# Patient Record
Sex: MALE | Race: WHITE | Employment: UNEMPLOYED | URBAN - METROPOLITAN AREA
[De-identification: names, ages, dates, MRNs, and addresses within clinical notes are randomized per-mention and may not be internally consistent; named-entity substitution may affect disease eponyms.]

---
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
## 2024-01-08 ENCOUNTER — TELEPHONE (OUTPATIENT)
Dept: PULMONOLOGY | Age: 19
End: 2024-01-08

## 2024-01-08 NOTE — TELEPHONE ENCOUNTER
Patient did not show for 31-90 day Set up 11/30/23  appointment  with Elisabeth Mann on 1/8/24    Same Day Cancellation: No    Patient rescheduled:  No    New appointment: n/a    Patient was also no show on: n/a    LOV 8/1/23    Assessment:       Moderate RONNIE  Snoring  Observed sleep apnea   Hypersomnia   Obesity  Morning headache         Plan:      Treatment options were discussed with patient for RONNIE, including CPAP therapy, oral appliances and upper airway surgery.  Patient is in favor of CPAP titration  CPAP titration  Advised to use CPAP 6-8 hrs at night and during naps  Replacement of mask, tubing, head straps every 3-6 months or sooner if damaged.   Patient instructed to contact [facility] for any mask, tubing or machine trouble shooting if problems arise  Sleep hygiene  Avoid sedatives, alcohol and caffeinated drinks at bed time.  No driving motorized vehicles or operating heavy machinery while fatigue, drowsy or sleepy.   Weight loss is also recommended as a long-term intervention.    Complications of RONNIE if not treated were discussed with patient patient to include systemic hypertension, pulmonary hypertension, cardiovascular morbidities, car accidents and all cause mortality.  Discussed pathophysiology of RONNIE with patient today  Patient education provided regarding sleep tips

## 2024-01-15 ENCOUNTER — OFFICE VISIT (OUTPATIENT)
Dept: PULMONOLOGY | Age: 19
End: 2024-01-15
Payer: COMMERCIAL

## 2024-01-15 ENCOUNTER — TELEPHONE (OUTPATIENT)
Dept: PULMONOLOGY | Age: 19
End: 2024-01-15

## 2024-01-15 VITALS
HEART RATE: 76 BPM | DIASTOLIC BLOOD PRESSURE: 82 MMHG | BODY MASS INDEX: 40.43 KG/M2 | OXYGEN SATURATION: 98 % | HEIGHT: 74 IN | SYSTOLIC BLOOD PRESSURE: 126 MMHG | WEIGHT: 315 LBS

## 2024-01-15 DIAGNOSIS — Z71.89 CPAP USE COUNSELING: ICD-10-CM

## 2024-01-15 DIAGNOSIS — E66.9 OBESITY (BMI 30-39.9): ICD-10-CM

## 2024-01-15 DIAGNOSIS — R53.83 OTHER FATIGUE: ICD-10-CM

## 2024-01-15 DIAGNOSIS — G47.33 MODERATE OBSTRUCTIVE SLEEP APNEA: Primary | ICD-10-CM

## 2024-01-15 PROCEDURE — 99213 OFFICE O/P EST LOW 20 MIN: CPT | Performed by: NURSE PRACTITIONER

## 2024-01-15 ASSESSMENT — SLEEP AND FATIGUE QUESTIONNAIRES
HOW LIKELY ARE YOU TO NOD OFF OR FALL ASLEEP WHEN YOU ARE A PASSENGER IN A CAR FOR AN HOUR WITHOUT A BREAK: 1
ESS TOTAL SCORE: 7
HOW LIKELY ARE YOU TO NOD OFF OR FALL ASLEEP WHILE WATCHING TV: 1
HOW LIKELY ARE YOU TO NOD OFF OR FALL ASLEEP WHILE SITTING AND READING: 1
HOW LIKELY ARE YOU TO NOD OFF OR FALL ASLEEP WHILE LYING DOWN TO REST IN THE AFTERNOON WHEN CIRCUMSTANCES PERMIT: 2
NECK CIRCUMFERENCE (INCHES): 19
HOW LIKELY ARE YOU TO NOD OFF OR FALL ASLEEP WHILE SITTING INACTIVE IN A PUBLIC PLACE: 0
HOW LIKELY ARE YOU TO NOD OFF OR FALL ASLEEP IN A CAR, WHILE STOPPED FOR A FEW MINUTES IN TRAFFIC: 0
HOW LIKELY ARE YOU TO NOD OFF OR FALL ASLEEP WHILE SITTING QUIETLY AFTER LUNCH WITHOUT ALCOHOL: 2
HOW LIKELY ARE YOU TO NOD OFF OR FALL ASLEEP WHILE SITTING AND TALKING TO SOMEONE: 0

## 2024-01-15 NOTE — PROGRESS NOTES
Patient ID: Yefri Andrade is a 18 y.o. male who is being seen today for   Chief Complaint   Patient presents with    Follow-up     31-90 follow up      Referring Dr. Diego Grande    HPI:     Yefri Andrade is a 18 y.o. male in office for RONNIE follow up.   CPAP titration was reviewed by me and noted below.  Results were dicussed with patient and multiple good questions were answered.  Patient started CPAP after testing.  States CPAP worked well he just needs to be better at using it.  States he did feel better, more energetic in the mornings.  States he falls asleep without it.      Patient is using CPAP 5-6 hrs/night. Using humidifier. No snoring on CPAP. The pressure is well tolerated. The mask is comfortable-full face (nasal mask did not work). No mask leak. No significant daytime sleepiness when using CPAP. Denies nodding off when driving. No dry nose or throat. Some fatigue when not using CPAP. Bedtime is 11 pm- MN and rise time is 6 am. Sleep onset is usually few minutes. Wakes up 1 times at night total. No nocturia. It takes few minutes to fall back a sleep. Occasional naps during the day. Occasional headache in am. No weight gain. 2 caffienated beverages during the day. No alcohol. ESS is 7        Initial HPI 5/12/23  Yefri Andrade is a 18 y.o. male for televideo appointment via video and audio virtual visit for snoring evaluation. Patient reports snoring at night for the past 5+ years. Worse in supine position. Wakes self snoring if dozing. Has witnessed apnea.  No restorative sleep. Sometimes dry mouth upon awakening. Fatigue and tiredness during the day. No history of sleep apnea. Bedtime 10-11 pm and rise time is 630 am. It takes usually 5-10 minutes to fall asleep.  No nocturia. Wakes up 1-2 times at night. It takes 30 minutes to fall back a sleep- look at phone. Takes 1 nap during the day when he can ( 60 minutes). Sometimes headache in am. No car wrecks or near wrecks because of the sleepiness. No nodding off

## 2024-01-15 NOTE — TELEPHONE ENCOUNTER
Faxed 31-90 ov notes and CR to [facility] at [phone] via [employer].

## 2024-01-15 NOTE — PATIENT INSTRUCTIONS
Set alarm or place CPAP on pillow at night to help get into consistent routine of wearing CPAP nightly.        Here are some tips to to getting better sleep  1- Avoid napping during the day: This will ensure you are tired at bedtime. If you have to take a nap, sleep less than one hour, before 3 pm.   2- Exercise regularly, but not right before bed: but the timing of the workout is important. Exercising in the morning or early afternoon will not interfere with sleep.  Exercising within two hours before bedtime can decrease your ability to fall asleep.  Regular exercise is recommended to help you deepen the sleep.   3- Avoid heavy, spicy, or sugary foods 4-6 hours before bedtime: These can affect your ability to stay asleep.  4- Have a light snack before bed: Having an empty stomach can interfere with your sleep. Dairy products and turkey contain tryptophan, which acts as a natural sleep inducer.   5- Stay away from caffeine, nicotine and alcohol at least 4-6 hours before bed: Caffeine and nicotine are stimulants that interfere with your ability to fall asleep. While alcohol has an immediate sleep-inducing effect, a few hours later, as alcohol levels in your blood start to fall, there is a stimulant effect and you will experience fragmented sleep.   6- Take a hot bath 90 minutes before bedtime:  A hot bath will raise your body temperature, but it is the drop in body temperature that may leave you feeling sleepy  7- Develop sleep rituals: it is important to give your body cues that it is time to slow down and sleep. Listen to relaxing music, read something soothing for 15 minutes, have a cup of caffeine free tea, or do relaxation exercises such as yoga or deep breathing help relieve anxiety and reduce muscle tension.   8- Fix a bedtime and an awakening time: Even on weekends! When your sleep cycle has a regular rhythm, you will feel better.   9- Sleep only when sleepy: This reduces the time you are awake in bed.   10-

## 2024-02-27 ENCOUNTER — TELEMEDICINE (OUTPATIENT)
Dept: SLEEP MEDICINE | Age: 19
End: 2024-02-27
Payer: COMMERCIAL

## 2024-02-27 DIAGNOSIS — Z71.89 CPAP USE COUNSELING: ICD-10-CM

## 2024-02-27 DIAGNOSIS — R53.83 OTHER FATIGUE: ICD-10-CM

## 2024-02-27 DIAGNOSIS — G47.33 MODERATE OBSTRUCTIVE SLEEP APNEA: Primary | ICD-10-CM

## 2024-02-27 DIAGNOSIS — E66.9 OBESITY (BMI 30-39.9): ICD-10-CM

## 2024-02-27 PROCEDURE — 99213 OFFICE O/P EST LOW 20 MIN: CPT | Performed by: NURSE PRACTITIONER

## 2024-02-27 ASSESSMENT — SLEEP AND FATIGUE QUESTIONNAIRES
HOW LIKELY ARE YOU TO NOD OFF OR FALL ASLEEP WHILE SITTING QUIETLY AFTER LUNCH WITHOUT ALCOHOL: 1
HOW LIKELY ARE YOU TO NOD OFF OR FALL ASLEEP WHILE SITTING INACTIVE IN A PUBLIC PLACE: 0
HOW LIKELY ARE YOU TO NOD OFF OR FALL ASLEEP WHEN YOU ARE A PASSENGER IN A CAR FOR AN HOUR WITHOUT A BREAK: 1
ESS TOTAL SCORE: 5
HOW LIKELY ARE YOU TO NOD OFF OR FALL ASLEEP WHILE LYING DOWN TO REST IN THE AFTERNOON WHEN CIRCUMSTANCES PERMIT: 1
HOW LIKELY ARE YOU TO NOD OFF OR FALL ASLEEP IN A CAR, WHILE STOPPED FOR A FEW MINUTES IN TRAFFIC: 0
HOW LIKELY ARE YOU TO NOD OFF OR FALL ASLEEP WHILE SITTING AND TALKING TO SOMEONE: 0
HOW LIKELY ARE YOU TO NOD OFF OR FALL ASLEEP WHILE WATCHING TV: 1
HOW LIKELY ARE YOU TO NOD OFF OR FALL ASLEEP WHILE SITTING AND READING: 1

## 2024-02-27 NOTE — PROGRESS NOTES
synchronous (real-time) audio-video encounter. The patient (or guardian if applicable) is aware that this is a billable service, which includes applicable co-pays. This Virtual Visit was conducted with patient's (and/or legal guardian's) consent. Patient identification was verified, and a caregiver was present when appropriate.   The patient was located at Home: [address]  [address]  [place] [address]  Provider was located at Home (Appt Dept State): [place]       Total time spent for this encounter: Not billed by time    --JACK LAYTON CNP on 2/27/2024 at 4:21 PM    An electronic signature was used to authenticate this note.

## 2024-04-12 ENCOUNTER — TELEMEDICINE (OUTPATIENT)
Dept: PULMONOLOGY | Age: 19
End: 2024-04-12
Payer: COMMERCIAL

## 2024-04-12 DIAGNOSIS — Z78.9 DIFFICULTY USING CONTINUOUS POSITIVE AIRWAY PRESSURE (CPAP) DEVICE: ICD-10-CM

## 2024-04-12 DIAGNOSIS — E66.9 OBESITY (BMI 30-39.9): ICD-10-CM

## 2024-04-12 DIAGNOSIS — G47.33 MODERATE OBSTRUCTIVE SLEEP APNEA: Primary | ICD-10-CM

## 2024-04-12 DIAGNOSIS — Z71.89 CPAP USE COUNSELING: ICD-10-CM

## 2024-04-12 PROCEDURE — 99214 OFFICE O/P EST MOD 30 MIN: CPT | Performed by: NURSE PRACTITIONER

## 2024-04-12 ASSESSMENT — SLEEP AND FATIGUE QUESTIONNAIRES
HOW LIKELY ARE YOU TO NOD OFF OR FALL ASLEEP WHEN YOU ARE A PASSENGER IN A CAR FOR AN HOUR WITHOUT A BREAK: WOULD NEVER DOZE
HOW LIKELY ARE YOU TO NOD OFF OR FALL ASLEEP WHILE WATCHING TV: SLIGHT CHANCE OF DOZING
HOW LIKELY ARE YOU TO NOD OFF OR FALL ASLEEP WHILE LYING DOWN TO REST IN THE AFTERNOON WHEN CIRCUMSTANCES PERMIT: SLIGHT CHANCE OF DOZING
HOW LIKELY ARE YOU TO NOD OFF OR FALL ASLEEP IN A CAR, WHILE STOPPED FOR A FEW MINUTES IN TRAFFIC: WOULD NEVER DOZE
HOW LIKELY ARE YOU TO NOD OFF OR FALL ASLEEP WHILE SITTING AND TALKING TO SOMEONE: WOULD NEVER DOZE
HOW LIKELY ARE YOU TO NOD OFF OR FALL ASLEEP WHILE SITTING INACTIVE IN A PUBLIC PLACE: WOULD NEVER DOZE
HOW LIKELY ARE YOU TO NOD OFF OR FALL ASLEEP WHILE SITTING AND READING: SLIGHT CHANCE OF DOZING
ESS TOTAL SCORE: 3
HOW LIKELY ARE YOU TO NOD OFF OR FALL ASLEEP WHILE SITTING QUIETLY AFTER LUNCH WITHOUT ALCOHOL: WOULD NEVER DOZE

## 2024-04-12 NOTE — PROGRESS NOTES
Patient ID: Yefri Andrade is a 18 y.o. male who is being seen today for   Chief Complaint   Patient presents with    Follow-up     Sleep     Referring Dr. Diego Grande    HPI:   Yefri Andrade is a 18 y.o. male for televideo appointment via video and audio virtual visit for RONNIE follow up.  States last few times he used CPAP it was waking him SOB right when falls asleep.  States also pressure too low initially.  Patient is using CPAP 1-2 hrs/night. Using humidifier. No snoring on CPAP.  The mask is comfortable- full face. No mask leak. No significant daytime sleepiness. No nodding off when driving. No dry nose or throat. Some fatigue. Bedtime is MN and rise time is 6 am. Sleep onset is usually 60 minutes- might watch TV. Wakes up 0-1 times at night total. No nocturia. It takes few minutes to fall back a sleep. Occasional naps during the day. Rare headache in am. No weight gain. 1 caffienated beverages during the day. ESS is 3        Previous HPI 2/27/24  Yefri Andrade is a 18 y.o. male for televideo appointment via video and audio virtual visit for RONNIE follow up.  States he had not been using CPAP for awhile, states was sick then out of habit, just restarted.  He cannot verbalize any barriers to CPAP use.  Patient is using CPAP   6 hrs/night. Using humidifier. No snoring on CPAP. The pressure is well tolerated. The mask is comfortable- full face. No mask leak. No significant daytime sleepiness. No nodding off when driving. No dry nose or throat. +fatigue. Bedtime is 11 pm- MN and rise time is 6 am. Sleep onset is few minutes. Wakes up 0-1 times at night total. No nocturia. It takes few minutes to fall back a sleep. Occasional naps during the day. No headache in am. No weight gain. 1 caffienated beverages during the day.  ESS is 5      Previous HPI  1/15/24  Yefri Andrade is a 18 y.o. male in office for RONNIE follow up.   CPAP titration was reviewed by me and noted below.  Results were dicussed with patient and multiple good

## 2024-05-28 ENCOUNTER — TELEPHONE (OUTPATIENT)
Dept: PULMONOLOGY | Age: 19
End: 2024-05-28

## 2024-05-28 NOTE — TELEPHONE ENCOUNTER
Patient cancelled appointment on 5/28/24 with Elisabeth Mann for 4-6 week sleep.    Reason: pt has been moving and hasn't used his machine    Patient did reschedule appointment.    Appointment rescheduled for 7/12/24.    Last OV 4/12/24:    Assessment:       Moderate RONNIE.  Auto CPAP 11-14 cm H2O.  Suboptimal compliance on review  Snoring-resolved on CPAP  Observed sleep apnea -resolved with CPAP  Hypersomnia -improved with CPAP use  Obesity  Morning headache         Plan:      Continue auto CPAP 11-14 cm H2O  Changed ramp start from 4 to 7 cm H2O  Consider BiPAP titration if no improvement  Discussed severity of sleep apnea and importance of treatment  Interpreted and reviewed CPAP download data with patient  Discussed set alarm to remind patient to use CPAP, incorporated into his nightly routine.    Advised to use CPAP 6-8 hrs at night and during naps  Replacement of mask, tubing, head straps every 3-6 months or sooner if damaged.   Patient instructed to contact [employer] for any mask, tubing or machine trouble shooting if problems arise  Sleep hygiene  Avoid sedatives, alcohol and caffeinated drinks at bed time.  No driving motorized vehicles or operating heavy machinery while fatigue, drowsy or sleepy.   Weight loss is also recommended as a long-term intervention.    Complications of RONNIE if not treated were discussed with patient patient to include systemic hypertension, pulmonary hypertension, cardiovascular morbidities, car accidents and all cause mortality.  Discussed pathophysiology of RONNIE with patient today  Patient education provided regarding sleep tips

## 2024-07-23 ENCOUNTER — TELEMEDICINE (OUTPATIENT)
Dept: SLEEP MEDICINE | Age: 19
End: 2024-07-23
Payer: COMMERCIAL

## 2024-07-23 DIAGNOSIS — R53.83 OTHER FATIGUE: ICD-10-CM

## 2024-07-23 DIAGNOSIS — G47.33 MODERATE OBSTRUCTIVE SLEEP APNEA: Primary | ICD-10-CM

## 2024-07-23 DIAGNOSIS — E66.9 OBESITY (BMI 30-39.9): ICD-10-CM

## 2024-07-23 DIAGNOSIS — Z78.9 DIFFICULTY USING CONTINUOUS POSITIVE AIRWAY PRESSURE (CPAP) DEVICE: ICD-10-CM

## 2024-07-23 DIAGNOSIS — Z71.89 CPAP USE COUNSELING: ICD-10-CM

## 2024-07-23 PROCEDURE — 99214 OFFICE O/P EST MOD 30 MIN: CPT | Performed by: NURSE PRACTITIONER

## 2024-07-23 NOTE — PROGRESS NOTES
Patient ID: Yefri Andrade is a 18 y.o. male who is being seen today for   Chief Complaint   Patient presents with    Follow-up     Discuss other options     Referring Dr. Diego Grande    HPI:   Yefri Andrade is a 18 y.o. male for televideo appointment via video and audio virtual visit for RONNIE follow up.  States he is not using CPAP.  States he just does not like having the mask on his face.  States he will continue to try to use CPAP but would like more information on other treatment options.  States he might be interested in surgical options or dental appliance.    Using humidifier if using CPAP. No snoring on CPAP. The pressure is well tolerated. The mask is comfortable- full face mask. No mask leak. +daytime sleepiness. Denies nodding off when driving. No dry nose or throat.  +fatigue. Bedtime is 11 pm- MN and rise time is 630 am. Sleep onset is few-30 minutes. Wakes up 0 times at night total. No nocturia. Occasional naps during the day. No headache in am. No weight gain. 1 caffienated beverages during the day. No alcohol. ESS is 6          Previous   Yefri Andrade is a 18 y.o. male for televideo appointment via video and audio virtual visit for RONNIE follow up.  States last few times he used CPAP it was waking him SOB right when falls asleep.  States also pressure too low initially.  Patient is using CPAP 1-2 hrs/night. Using humidifier. No snoring on CPAP.  The mask is comfortable- full face. No mask leak. No significant daytime sleepiness. No nodding off when driving. No dry nose or throat. Some fatigue. Bedtime is MN and rise time is 6 am. Sleep onset is usually 60 minutes- might watch TV. Wakes up 0-1 times at night total. No nocturia. It takes few minutes to fall back a sleep. Occasional naps during the day. Rare headache in am. No weight gain. 1 caffienated beverages during the day. ESS is 3        Previous HPI 2/27/24  Yefri Andrade is a 18 y.o. male for televideo appointment via video and audio virtual visit for RONNIE

## 2024-07-23 NOTE — PATIENT INSTRUCTIONS
Contact Dr. Damon [facility] Sleep dentist for information regarding oral airway appliance.   Phone: [phone]  Address: [address] [place] [address] [address], [place] [address]    It is recommend you call your insurance plan to check if this provider is in network with your plan prior to first visit.       Here are some tips to to getting better sleep  1- Avoid napping during the day: This will ensure you are tired at bedtime. If you have to take a nap, sleep less than one hour, before 3 pm.   2- Exercise regularly, but not right before bed: but the timing of the workout is important. Exercising in the morning or early afternoon will not interfere with sleep.  Exercising within two hours before bedtime can decrease your ability to fall asleep.  Regular exercise is recommended to help you deepen the sleep.   3- Avoid heavy, spicy, or sugary foods 4-6 hours before bedtime: These can affect your ability to stay asleep.  4- Have a light snack before bed: Having an empty stomach can interfere with your sleep. Dairy products and turkey contain tryptophan, which acts as a natural sleep inducer.   5- Stay away from caffeine, nicotine and alcohol at least 4-6 hours before bed: Caffeine and nicotine are stimulants that interfere with your ability to fall asleep. While alcohol has an immediate sleep-inducing effect, a few hours later, as alcohol levels in your blood start to fall, there is a stimulant effect and you will experience fragmented sleep.   6- Take a hot bath 90 minutes before bedtime:  A hot bath will raise your body temperature, but it is the drop in body temperature that may leave you feeling sleepy  7- Develop sleep rituals: it is important to give your body cues that it is time to slow down and sleep. Listen to relaxing music, read something soothing for 15 minutes, have a cup of caffeine free tea, or do relaxation exercises such as yoga or deep breathing help relieve anxiety and reduce muscle tension.   8- Fix a

## 2024-08-07 ENCOUNTER — TELEPHONE (OUTPATIENT)
Dept: PULMONOLOGY | Age: 19
End: 2024-08-07

## 2024-09-06 ENCOUNTER — APPOINTMENT (OUTPATIENT)
Dept: CT IMAGING | Age: 19
End: 2024-09-06
Payer: COMMERCIAL

## 2024-09-06 ENCOUNTER — HOSPITAL ENCOUNTER (EMERGENCY)
Age: 19
Discharge: HOME | End: 2024-09-06
Payer: COMMERCIAL

## 2024-09-06 VITALS
WEIGHT: 280 LBS | RESPIRATION RATE: 18 BRPM | TEMPERATURE: 97.5 F | OXYGEN SATURATION: 98 % | HEART RATE: 89 BPM | BODY MASS INDEX: 35.94 KG/M2 | HEIGHT: 74 IN | SYSTOLIC BLOOD PRESSURE: 153 MMHG | DIASTOLIC BLOOD PRESSURE: 94 MMHG

## 2024-09-06 DIAGNOSIS — J03.90 ACUTE TONSILLITIS, UNSPECIFIED ETIOLOGY: Primary | ICD-10-CM

## 2024-09-06 DIAGNOSIS — R59.0 CERVICAL LYMPHADENOPATHY: ICD-10-CM

## 2024-09-06 LAB
ALBUMIN SERPL-MCNC: 4.4 G/DL (ref 3.4–5)
ALBUMIN/GLOB SERPL: 1.5 {RATIO} (ref 1.1–2.2)
ALP SERPL-CCNC: 78 U/L (ref 40–129)
ALT SERPL-CCNC: 45 U/L (ref 10–40)
ANION GAP SERPL CALCULATED.3IONS-SCNC: 17 MMOL/L (ref 3–16)
AST SERPL-CCNC: 28 U/L (ref 15–37)
BASOPHILS # BLD: 0 K/UL (ref 0–0.2)
BASOPHILS NFR BLD: 0 %
BILIRUB SERPL-MCNC: 0.6 MG/DL (ref 0–1)
BUN SERPL-MCNC: 21 MG/DL (ref 7–20)
CALCIUM SERPL-MCNC: 9.9 MG/DL (ref 8.3–10.6)
CHLORIDE SERPL-SCNC: 100 MMOL/L (ref 99–110)
CO2 SERPL-SCNC: 22 MMOL/L (ref 21–32)
CREAT SERPL-MCNC: 0.8 MG/DL (ref 0.9–1.3)
DEPRECATED RDW RBC AUTO: 12.5 % (ref 12.4–15.4)
EOSINOPHIL # BLD: 0.1 K/UL (ref 0–0.6)
EOSINOPHIL NFR BLD: 1 %
FLUAV RNA UPPER RESP QL NAA+PROBE: NEGATIVE
FLUBV AG NPH QL: NEGATIVE
GFR SERPLBLD CREATININE-BSD FMLA CKD-EPI: >90 ML/MIN/{1.73_M2}
GLUCOSE SERPL-MCNC: 95 MG/DL (ref 70–99)
HCT VFR BLD AUTO: 45 % (ref 40.5–52.5)
HGB BLD-MCNC: 15.2 G/DL (ref 13.5–17.5)
LACTATE BLDV-SCNC: 1 MMOL/L (ref 0.4–1.9)
LYMPHOCYTES # BLD: 6.2 K/UL (ref 1–5.1)
LYMPHOCYTES NFR BLD: 48 %
MCH RBC QN AUTO: 30.9 PG (ref 26–34)
MCHC RBC AUTO-ENTMCNC: 33.8 G/DL (ref 31–36)
MCV RBC AUTO: 91.6 FL (ref 80–100)
MONOCYTES # BLD: 0.6 K/UL (ref 0–1.3)
MONOCYTES NFR BLD: 6 %
NEUTROPHILS # BLD: 3.7 K/UL (ref 1.7–7.7)
NEUTROPHILS NFR BLD: 35 %
PATH INTERP BLD-IMP: YES
PLATELET # BLD AUTO: 154 K/UL (ref 135–450)
PLATELET BLD QL SMEAR: ADEQUATE
PMV BLD AUTO: 8.1 FL (ref 5–10.5)
POTASSIUM SERPL-SCNC: 3.6 MMOL/L (ref 3.5–5.1)
PROT SERPL-MCNC: 7.4 G/DL (ref 6.4–8.2)
RBC # BLD AUTO: 4.91 M/UL (ref 4.2–5.9)
S PYO AG THROAT QL: NEGATIVE
SLIDE REVIEW: ABNORMAL
SODIUM SERPL-SCNC: 139 MMOL/L (ref 136–145)
VARIANT LYMPHS NFR BLD MANUAL: 10 % (ref 0–6)
WBC # BLD AUTO: 10.7 K/UL (ref 4–11)

## 2024-09-06 PROCEDURE — 87804 INFLUENZA ASSAY W/OPTIC: CPT

## 2024-09-06 PROCEDURE — 96374 THER/PROPH/DIAG INJ IV PUSH: CPT

## 2024-09-06 PROCEDURE — 6370000000 HC RX 637 (ALT 250 FOR IP): Performed by: PHYSICIAN ASSISTANT

## 2024-09-06 PROCEDURE — 83605 ASSAY OF LACTIC ACID: CPT

## 2024-09-06 PROCEDURE — 87880 STREP A ASSAY W/OPTIC: CPT

## 2024-09-06 PROCEDURE — 85025 COMPLETE CBC W/AUTO DIFF WBC: CPT

## 2024-09-06 PROCEDURE — 6360000002 HC RX W HCPCS: Performed by: PHYSICIAN ASSISTANT

## 2024-09-06 PROCEDURE — 96361 HYDRATE IV INFUSION ADD-ON: CPT

## 2024-09-06 PROCEDURE — 99285 EMERGENCY DEPT VISIT HI MDM: CPT

## 2024-09-06 PROCEDURE — 6360000004 HC RX CONTRAST MEDICATION: Performed by: PHYSICIAN ASSISTANT

## 2024-09-06 PROCEDURE — 80053 COMPREHEN METABOLIC PANEL: CPT

## 2024-09-06 PROCEDURE — 96375 TX/PRO/DX INJ NEW DRUG ADDON: CPT

## 2024-09-06 PROCEDURE — 2580000003 HC RX 258: Performed by: PHYSICIAN ASSISTANT

## 2024-09-06 PROCEDURE — 70491 CT SOFT TISSUE NECK W/DYE: CPT

## 2024-09-06 RX ORDER — IOPAMIDOL 755 MG/ML
75 INJECTION, SOLUTION INTRAVASCULAR
Status: COMPLETED | OUTPATIENT
Start: 2024-09-06 | End: 2024-09-06

## 2024-09-06 RX ORDER — PROCHLORPERAZINE EDISYLATE 5 MG/ML
10 INJECTION INTRAMUSCULAR; INTRAVENOUS ONCE
Status: COMPLETED | OUTPATIENT
Start: 2024-09-06 | End: 2024-09-06

## 2024-09-06 RX ORDER — AZITHROMYCIN 250 MG/1
TABLET, FILM COATED ORAL
Qty: 1 PACKET | Refills: 0 | Status: SHIPPED | OUTPATIENT
Start: 2024-09-06

## 2024-09-06 RX ORDER — 0.9 % SODIUM CHLORIDE 0.9 %
1000 INTRAVENOUS SOLUTION INTRAVENOUS ONCE
Status: COMPLETED | OUTPATIENT
Start: 2024-09-06 | End: 2024-09-06

## 2024-09-06 RX ORDER — KETOROLAC TROMETHAMINE 10 MG/1
10 TABLET, FILM COATED ORAL EVERY 8 HOURS PRN
Qty: 10 TABLET | Refills: 0 | Status: SHIPPED | OUTPATIENT
Start: 2024-09-06

## 2024-09-06 RX ORDER — ONDANSETRON 4 MG/1
4 TABLET, ORALLY DISINTEGRATING ORAL EVERY 8 HOURS PRN
Qty: 10 TABLET | Refills: 0 | Status: SHIPPED | OUTPATIENT
Start: 2024-09-06

## 2024-09-06 RX ORDER — KETOROLAC TROMETHAMINE 15 MG/ML
15 INJECTION, SOLUTION INTRAMUSCULAR; INTRAVENOUS ONCE
Status: COMPLETED | OUTPATIENT
Start: 2024-09-06 | End: 2024-09-06

## 2024-09-06 RX ORDER — AZITHROMYCIN 250 MG/1
500 TABLET, FILM COATED ORAL ONCE
Status: COMPLETED | OUTPATIENT
Start: 2024-09-06 | End: 2024-09-06

## 2024-09-06 RX ORDER — DEXAMETHASONE SODIUM PHOSPHATE 10 MG/ML
10 INJECTION, SOLUTION INTRAMUSCULAR; INTRAVENOUS ONCE
Status: COMPLETED | OUTPATIENT
Start: 2024-09-06 | End: 2024-09-06

## 2024-09-06 RX ORDER — DIPHENHYDRAMINE HYDROCHLORIDE 50 MG/ML
25 INJECTION INTRAMUSCULAR; INTRAVENOUS ONCE
Status: COMPLETED | OUTPATIENT
Start: 2024-09-06 | End: 2024-09-06

## 2024-09-06 RX ORDER — PREDNISONE 20 MG/1
20 TABLET ORAL 2 TIMES DAILY
Qty: 10 TABLET | Refills: 0 | Status: SHIPPED | OUTPATIENT
Start: 2024-09-06 | End: 2024-09-11

## 2024-09-06 RX ADMIN — SODIUM CHLORIDE 1000 ML: 9 INJECTION, SOLUTION INTRAVENOUS at 18:15

## 2024-09-06 RX ADMIN — DEXAMETHASONE SODIUM PHOSPHATE 10 MG: 10 INJECTION, SOLUTION INTRAMUSCULAR; INTRAVENOUS at 21:00

## 2024-09-06 RX ADMIN — KETOROLAC TROMETHAMINE 15 MG: 15 INJECTION, SOLUTION INTRAMUSCULAR; INTRAVENOUS at 18:16

## 2024-09-06 RX ADMIN — PROCHLORPERAZINE EDISYLATE 10 MG: 5 INJECTION INTRAMUSCULAR; INTRAVENOUS at 18:18

## 2024-09-06 RX ADMIN — IOPAMIDOL 75 ML: 755 INJECTION, SOLUTION INTRAVENOUS at 19:13

## 2024-09-06 RX ADMIN — AZITHROMYCIN 500 MG: 250 TABLET, FILM COATED ORAL at 20:59

## 2024-09-06 RX ADMIN — DIPHENHYDRAMINE HYDROCHLORIDE 25 MG: 50 INJECTION INTRAMUSCULAR; INTRAVENOUS at 18:16

## 2024-09-06 ASSESSMENT — PAIN SCALES - GENERAL
PAINLEVEL_OUTOF10: 3
PAINLEVEL_OUTOF10: 7
PAINLEVEL_OUTOF10: 5

## 2024-09-06 ASSESSMENT — LIFESTYLE VARIABLES
HOW OFTEN DO YOU HAVE A DRINK CONTAINING ALCOHOL: NEVER
HOW MANY STANDARD DRINKS CONTAINING ALCOHOL DO YOU HAVE ON A TYPICAL DAY: PATIENT DOES NOT DRINK

## 2024-09-06 ASSESSMENT — PAIN - FUNCTIONAL ASSESSMENT: PAIN_FUNCTIONAL_ASSESSMENT: 0-10

## 2024-09-06 ASSESSMENT — PAIN DESCRIPTION - LOCATION: LOCATION: THROAT

## 2024-09-06 NOTE — ED PROVIDER NOTES
[facility] ED  EMERGENCY DEPARTMENT ENCOUNTER        Pt Name: Yefri Andrade  MRN: [id number]  Birthdate 2005  Date of evaluation: 9/6/2024  Provider: Elsie Briseno PA-C  PCP: Rich Grande MD  Note Started: 5:56 PM EDT 9/6/24      AMBER. I have evaluated this patient.        CHIEF COMPLAINT       Chief Complaint   Patient presents with    Illness     States he was diagnosed with mono on Tuesday.  Feels worse.  Pain in ear, head and around to the back of his head       HISTORY OF PRESENT ILLNESS: 1 or more Elements     History From: Patient and mother  Limitations to history : None    Yefri Andrade is a 18 y.o. male who presents to the emergency department for evaluation of sore throat, neck pain, headache, left ear pain, general fatigue overall does not feeling well.  He was diagnosed with mono on Tuesday 3 days ago by blood test and had been doing better but today feeling worse.  Also of note his girlfriend just tested positive for influenza B today.  States his headache is not as bad now.  He does have a history of headaches states in the past he has gotten bad headaches and has had migraines before.  Headache is mild at this time.  Nausea.  No vomiting.  Has had chills and sweats no documented fevers states he has been taking ibuprofen.  Prior to being diagnosed with mono he initially had pain at the left side of his mouth was seen at urgent care and was told possible salivary gland problem and was initially placed on Augmentin but after he was diagnosed with mono they advised him to stop taking the antibiotic.      Nursing Notes were all reviewed and agreed with or any disagreements were addressed in the HPI.    REVIEW OF SYSTEMS :      Review of Systems    Positives and Pertinent negatives as per HPI.     SURGICAL HISTORY     Past Surgical History:   Procedure Laterality Date    ADENOIDECTOMY      TYMPANOSTOMY TUBE PLACEMENT         CURRENTMEDICATIONS       Previous Medications  Discussed with him these findings may be due to mono also possibility of superimposed bacterial infection with his worsening symptoms elected to cover him with antibiotics he was placed on Zithromax and also placed on a course of steroids.  He is overall well-appearing.  Appropriate for discharge home with family.  Strict return precautions discussed return to the ER for any worsening.  He understands.      I estimate there is LOW risk for MENINGITIS, PERITONSILLAR ABSCESS, SEPSIS, MALIGNANT OTITIS EXTERNA, OR EPIGLOTTITIS thus I consider the discharge disposition reasonable.       I am the Primary Clinician of Record.  FINAL IMPRESSION      1. Acute tonsillitis, unspecified etiology    2. Cervical lymphadenopathy          DISPOSITION/PLAN     DISPOSITION Decision to Discharge    PATIENT REFERRED TO:  Rich Grande MD  [address] [facility] [address]    In 3 days      [facility] ED  [address] [place] [address]  [phone]    If symptoms worsen      DISCHARGE MEDICATIONS:  New Prescriptions    AZITHROMYCIN (ZITHROMAX) 250 MG TABLET    2 po day 1, then 1 po days 2-5    KETOROLAC (TORADOL) 10 MG TABLET    Take 1 tablet by mouth every 8 hours as needed for Pain    ONDANSETRON (ZOFRAN-ODT) 4 MG DISINTEGRATING TABLET    Take 1 tablet by mouth every 8 hours as needed for Nausea or Vomiting    PREDNISONE (DELTASONE) 20 MG TABLET    Take 1 tablet by mouth 2 times daily for 5 days       DISCONTINUED MEDICATIONS:  Discontinued Medications    No medications on file              (Please note that portions of this note were completed with a voice recognition program.  Efforts were made to edit the dictations but occasionally words are mis-transcribed.)    Elsie Briseno PA-C (electronically signed)           Elsie Briseno PA-C  09/06/24 [id number]

## 2024-09-07 NOTE — DISCHARGE INSTRUCTIONS
Your CT scan is showing acute tonsillitis with bilateral enlarged lymph nodes.  This may be due to mono but with your worsening symptoms concern for superimposed bacterial infection and placed on antibiotics and steroids.  Increase fluid intake.  Follow-up with your doctor Monday.  Return to the ER of the week and for any worsening symptoms or difficulty swallowing.

## 2024-09-09 LAB — PATH INTERP BLD-IMP: NORMAL

## 2024-10-22 ENCOUNTER — TELEMEDICINE (OUTPATIENT)
Dept: SLEEP MEDICINE | Age: 19
End: 2024-10-22
Payer: COMMERCIAL

## 2024-10-22 DIAGNOSIS — R53.83 OTHER FATIGUE: ICD-10-CM

## 2024-10-22 DIAGNOSIS — E66.9 OBESITY (BMI 30-39.9): ICD-10-CM

## 2024-10-22 DIAGNOSIS — G47.33 MODERATE OBSTRUCTIVE SLEEP APNEA: Primary | ICD-10-CM

## 2024-10-22 PROCEDURE — 99213 OFFICE O/P EST LOW 20 MIN: CPT | Performed by: NURSE PRACTITIONER

## 2024-10-22 ASSESSMENT — SLEEP AND FATIGUE QUESTIONNAIRES
HOW LIKELY ARE YOU TO NOD OFF OR FALL ASLEEP WHEN YOU ARE A PASSENGER IN A CAR FOR AN HOUR WITHOUT A BREAK: SLIGHT CHANCE OF DOZING
HOW LIKELY ARE YOU TO NOD OFF OR FALL ASLEEP WHILE LYING DOWN TO REST IN THE AFTERNOON WHEN CIRCUMSTANCES PERMIT: SLIGHT CHANCE OF DOZING
HOW LIKELY ARE YOU TO NOD OFF OR FALL ASLEEP IN A CAR, WHILE STOPPED FOR A FEW MINUTES IN TRAFFIC: WOULD NEVER DOZE
HOW LIKELY ARE YOU TO NOD OFF OR FALL ASLEEP WHILE SITTING INACTIVE IN A PUBLIC PLACE: WOULD NEVER DOZE
HOW LIKELY ARE YOU TO NOD OFF OR FALL ASLEEP WHILE SITTING AND TALKING TO SOMEONE: WOULD NEVER DOZE
ESS TOTAL SCORE: 2
HOW LIKELY ARE YOU TO NOD OFF OR FALL ASLEEP WHILE SITTING QUIETLY AFTER LUNCH WITHOUT ALCOHOL: WOULD NEVER DOZE
HOW LIKELY ARE YOU TO NOD OFF OR FALL ASLEEP WHILE WATCHING TV: WOULD NEVER DOZE
HOW LIKELY ARE YOU TO NOD OFF OR FALL ASLEEP WHILE SITTING AND READING: WOULD NEVER DOZE

## 2024-10-22 NOTE — PROGRESS NOTES
atraumatic. Normal appearing nose. External Ears normal.   Neck: No visualized mass. Trachea is midline   Eyes: EOM intact. No visible discharge.   Resp:No visualized signs of difficulty breathing or respiratory distress, speaking in full sentences.  Respiratory effort normal.  Neuro: Awake. Alert.  Able to follow commands.  No facial asymmetry.  Skin: No significant lesions or discoloration noted on facial skin    Musculoskeletal: Normal range of motion of the neck.  Psych: Oriented x 3. No anxiety. Normal affect.          DATA:   7/26/2023 PSG AHI 22.3, low SPO2 90%  10/25/23-titration- improved sleep-related breathing with CPAP, recommendations trial auto CPAP 11-14 cm H2O    CPAP download data:  Compliance download report from 12/2/23 to 12/31/23 reviewed today by me and showed patient is using machine 6:01 hrs/night with 7% compliance and AHI 0.4 within this time frame.  2/30days with greater than 4 hours of machine use.  90% pressure 11.3 cm H20 on auto CPAP 11-14 cm H2O    Compliance download report from 1/28/24 to 2/26/24 reviewed today by me and showed patient is using machine 3:06 hrs/night with 0% compliance and AHI 0.5 within this time frame.  0/30days with greater than 4 hours of machine use. 2/30 days with any CPAP use.  90% pressure 11.2 cm H20 on auto CPAP 11-14 cm H2O     Compliance download report from 3/10/24 to 4/8/24 reviewed today by me and showed patient is using machine 1:41 hrs/night with 0% compliance and AHI 2.3 within this time frame.  0/30days with greater than 4 hours of machine use. 2/30 days with any CPAP use.  90% pressure 11.5 cm H20 on auto CPAP 11-14 cm H2O.    7/23/2024-no CPAP use in past 30+ days        Assessment:       Moderate RONNIE.  Oral appliance  Snoring-resolved on oral appliance  Observed sleep apnea -resolved with oral appliance  Hypersomnia -improved with oral appliance  Obesity  Morning headache-resolved        Plan:      Continue oral appliance for sleep apnea

## 2025-01-31 ENCOUNTER — TELEPHONE (OUTPATIENT)
Dept: PULMONOLOGY | Age: 20
End: 2025-01-31

## 2025-01-31 NOTE — TELEPHONE ENCOUNTER
Patient had HST with oral appliance with Dr. Damon's office. Need results before follow up appt with Elisabeth

## 2025-02-06 NOTE — TELEPHONE ENCOUNTER
I called and s/w Heather at Dr. Damon's office.  She said Sheila is out of the office until Monday but she said she will give a note to fax over results and office notes before appt on Tuesday, 2/11/25.

## 2025-02-18 ENCOUNTER — TELEMEDICINE (OUTPATIENT)
Dept: SLEEP MEDICINE | Age: 20
End: 2025-02-18
Payer: COMMERCIAL

## 2025-02-18 ENCOUNTER — TELEPHONE (OUTPATIENT)
Dept: PULMONOLOGY | Age: 20
End: 2025-02-18

## 2025-02-18 DIAGNOSIS — G47.33 MODERATE OBSTRUCTIVE SLEEP APNEA: Primary | ICD-10-CM

## 2025-02-18 DIAGNOSIS — E66.9 OBESITY (BMI 30-39.9): ICD-10-CM

## 2025-02-18 DIAGNOSIS — R53.83 OTHER FATIGUE: ICD-10-CM

## 2025-02-18 PROCEDURE — 99214 OFFICE O/P EST MOD 30 MIN: CPT | Performed by: NURSE PRACTITIONER

## 2025-02-18 ASSESSMENT — SLEEP AND FATIGUE QUESTIONNAIRES
HOW LIKELY ARE YOU TO NOD OFF OR FALL ASLEEP WHILE SITTING INACTIVE IN A PUBLIC PLACE: WOULD NEVER DOZE
HOW LIKELY ARE YOU TO NOD OFF OR FALL ASLEEP IN A CAR, WHILE STOPPED FOR A FEW MINUTES IN TRAFFIC: WOULD NEVER DOZE
HOW LIKELY ARE YOU TO NOD OFF OR FALL ASLEEP WHILE SITTING AND READING: WOULD NEVER DOZE
ESS TOTAL SCORE: 2
HOW LIKELY ARE YOU TO NOD OFF OR FALL ASLEEP WHILE SITTING QUIETLY AFTER LUNCH WITHOUT ALCOHOL: WOULD NEVER DOZE
HOW LIKELY ARE YOU TO NOD OFF OR FALL ASLEEP WHILE WATCHING TV: SLIGHT CHANCE OF DOZING
HOW LIKELY ARE YOU TO NOD OFF OR FALL ASLEEP WHEN YOU ARE A PASSENGER IN A CAR FOR AN HOUR WITHOUT A BREAK: WOULD NEVER DOZE
HOW LIKELY ARE YOU TO NOD OFF OR FALL ASLEEP WHILE LYING DOWN TO REST IN THE AFTERNOON WHEN CIRCUMSTANCES PERMIT: SLIGHT CHANCE OF DOZING
HOW LIKELY ARE YOU TO NOD OFF OR FALL ASLEEP WHILE SITTING AND TALKING TO SOMEONE: WOULD NEVER DOZE

## 2025-02-18 NOTE — TELEPHONE ENCOUNTER
Faxed OV note to Dr. Damon's office via [employer]. Patient needs to repeat HST w/ oral appliance. Scheduled 3 month follow up.

## 2025-02-18 NOTE — PROGRESS NOTES
Patient ID: Yefri Andrade is a 19 y.o. male who is being seen today for   Chief Complaint   Patient presents with    Follow-up    Sleep Apnea     Referring Dr. Diego Grande    HPI:   Yefri Andrade is a 19 y.o. male for televideo appointment via video and audio virtual visit for RONNIE follow up.  He has been using oral appliance per Dr. Damon.  He had HST with oral appliance in place, AHI was 5, however sleep time was only 1.5 hours. States he is not snoring with oral appliance.  No jaw pain.  States he is doing well with oral appliance    No significant daytime sleepiness. No nodding off when driving. Some fatigue. Bedtime is 10-11 pm and rise time is 630 am. Sleep onset is few-30 minutes. Wakes up 1 times at night total. No nocturia. It takes few minutes to fall back a sleep. Occasional naps during the day- 60 min. No headache in am. No weight gain. 1-2 caffienated beverages during the day. No alcohol. ESS is 2       Previous HPI 10/22/24  Yefri Andrade is a 19 y.o. male for televideo appointment via video and audio virtual visit for RONNIE follow up.  He did see Dr. Damon regarding oral appliance for sleep apnea treatment.  States he did get oral appliance.  No pain.  No snoring with oral appliance.  States he has appointment this month and will have HST  with oral appliance    No significant daytime sleepiness. No nodding off when driving. No dry nose or throat. No fatigue. Bedtime is 11 pm and rise time is 630 am. Sleep onset is few -60 minutes- watches TV. Wakes up 0 times at night total. No nocturia.  Rare naps during the day. No headache in am. No weight gain. 1-2 caffienated beverages during the day. No alcohol. ESS is 2        Previous HPI 7/3/24  Yefri Andrade is a 19 y.o. male for televideo appointment via video and audio virtual visit for RONNIE follow up.  States he is not using CPAP.  States he just does not like having the mask on his face.  States he will continue to try to use CPAP but would like more

## 2025-05-27 ENCOUNTER — TELEMEDICINE (OUTPATIENT)
Dept: SLEEP MEDICINE | Age: 20
End: 2025-05-27
Payer: COMMERCIAL

## 2025-05-27 DIAGNOSIS — G47.33 MODERATE OBSTRUCTIVE SLEEP APNEA: Primary | ICD-10-CM

## 2025-05-27 DIAGNOSIS — E66.9 OBESITY (BMI 30-39.9): ICD-10-CM

## 2025-05-27 PROCEDURE — 99213 OFFICE O/P EST LOW 20 MIN: CPT | Performed by: NURSE PRACTITIONER

## 2025-05-27 ASSESSMENT — SLEEP AND FATIGUE QUESTIONNAIRES
HOW LIKELY ARE YOU TO NOD OFF OR FALL ASLEEP WHILE LYING DOWN TO REST IN THE AFTERNOON WHEN CIRCUMSTANCES PERMIT: MODERATE CHANCE OF DOZING
HOW LIKELY ARE YOU TO NOD OFF OR FALL ASLEEP WHILE SITTING AND TALKING TO SOMEONE: WOULD NEVER DOZE
HOW LIKELY ARE YOU TO NOD OFF OR FALL ASLEEP WHILE SITTING INACTIVE IN A PUBLIC PLACE: WOULD NEVER DOZE
HOW LIKELY ARE YOU TO NOD OFF OR FALL ASLEEP IN A CAR, WHILE STOPPED FOR A FEW MINUTES IN TRAFFIC: WOULD NEVER DOZE
HOW LIKELY ARE YOU TO NOD OFF OR FALL ASLEEP WHILE SITTING AND READING: MODERATE CHANCE OF DOZING
ESS TOTAL SCORE: 8
HOW LIKELY ARE YOU TO NOD OFF OR FALL ASLEEP WHILE WATCHING TV: MODERATE CHANCE OF DOZING
HOW LIKELY ARE YOU TO NOD OFF OR FALL ASLEEP WHEN YOU ARE A PASSENGER IN A CAR FOR AN HOUR WITHOUT A BREAK: MODERATE CHANCE OF DOZING
HOW LIKELY ARE YOU TO NOD OFF OR FALL ASLEEP WHILE SITTING QUIETLY AFTER LUNCH WITHOUT ALCOHOL: WOULD NEVER DOZE

## 2025-05-27 NOTE — PROGRESS NOTES
Patient ID: Yefri Andrade is a 19 y.o. male who is being seen today for   Chief Complaint   Patient presents with    Follow-up    Sleep Apnea     Referring Dr. Diego Grande    HPI:     Yefri Andrade is a 19 y.o. male for televideo appointment via video and audio virtual visit for RONNIE follow up.  States he is doing well with oral appliance.  No snoring. No pain.  States he does feel better when using oral appliance.     No significant daytime sleepiness. No nodding off when driving. No dry nose or throat. No fatigue. Bedtime is 10-11 pm and rise time is 6 am. Sleep onset is few-30 minutes. Wakes up 0 times at night total. No nocturia. Occasional  naps during the day. No headache in am. No weight gain. 1 caffienated beverages during the day. No alcohol. ESS is 8          Previous HPI 2/18/25  Yefri Andrade is a 19 y.o. male for televideo appointment via video and audio virtual visit for RONNIE follow up.  He has been using oral appliance per Dr. Damon.  He had HST with oral appliance in place, AHI was 5, however sleep time was only 1.5 hours. States he is not snoring with oral appliance.  No jaw pain.  States he is doing well with oral appliance    No significant daytime sleepiness. No nodding off when driving. Some fatigue. Bedtime is 10-11 pm and rise time is 630 am. Sleep onset is few-30 minutes. Wakes up 1 times at night total. No nocturia. It takes few minutes to fall back a sleep. Occasional naps during the day- 60 min. No headache in am. No weight gain. 1-2 caffienated beverages during the day. No alcohol. ESS is 2       Previous HPI 10/22/24  Yefri Andrade is a 19 y.o. male for televideo appointment via video and audio virtual visit for RONNIE follow up.  He did see Dr. Damon regarding oral appliance for sleep apnea treatment.  States he did get oral appliance.  No pain.  No snoring with oral appliance.  States he has appointment this month and will have HST  with oral appliance    No significant daytime sleepiness.